# Patient Record
Sex: FEMALE | Race: WHITE | NOT HISPANIC OR LATINO | Employment: STUDENT | URBAN - METROPOLITAN AREA
[De-identification: names, ages, dates, MRNs, and addresses within clinical notes are randomized per-mention and may not be internally consistent; named-entity substitution may affect disease eponyms.]

---
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
## 2018-07-10 ENCOUNTER — HOSPITAL ENCOUNTER (OUTPATIENT)
Dept: FAMILY MEDICINE CLINIC | Facility: CLINIC | Age: 18
Setting detail: SPECIMEN
Discharge: HOME | End: 2018-07-10
Attending: FAMILY MEDICINE | Admitting: FAMILY MEDICINE

## 2018-07-10 LAB
ALBUMIN SERPL-MCNC: 4.2 G/DL (ref 3.5–4.8)
ALBUMIN/GLOB SERPL: 1.2 {RATIO} (ref 1–1.7)
ALP SERPL-CCNC: 56 IU/L (ref 32–91)
ALT SERPL-CCNC: 20 IU/L (ref 14–54)
ANION GAP SERPL CALC-SCNC: 10.9 MMOL/L (ref 10–20)
AST SERPL-CCNC: 20 IU/L (ref 15–41)
BASOPHILS # BLD AUTO: 0.1 10*3/UL (ref 0–0.2)
BASOPHILS NFR BLD AUTO: 1 % (ref 0–2)
BILIRUB SERPL-MCNC: 0.6 MG/DL (ref 0.3–1.2)
BUN SERPL-MCNC: 5 MG/DL (ref 8–20)
BUN/CREAT SERPL: 6.3 (ref 5.4–26.2)
CALCIUM SERPL-MCNC: 9.6 MG/DL (ref 8.9–10.3)
CHLORIDE SERPL-SCNC: 106 MMOL/L (ref 101–111)
CONV CO2: 24 MMOL/L (ref 22–32)
CONV TOTAL PROTEIN: 7.6 G/DL (ref 6.1–7.9)
CREAT UR-MCNC: 0.8 MG/DL (ref 0.4–1)
D DIMER PPP FEU-MCNC: 0.18 MG/L FEU (ref 0.17–0.59)
DIFFERENTIAL METHOD BLD: (no result)
EOSINOPHIL # BLD AUTO: 0.2 10*3/UL (ref 0–0.3)
EOSINOPHIL # BLD AUTO: 2 % (ref 0–3)
ERYTHROCYTE [DISTWIDTH] IN BLOOD BY AUTOMATED COUNT: 12.6 % (ref 11.5–14.5)
GLOBULIN UR ELPH-MCNC: 3.4 G/DL (ref 2.5–3.8)
GLUCOSE SERPL-MCNC: 93 MG/DL (ref 65–99)
HCT VFR BLD AUTO: 40.2 % (ref 35–49)
HGB BLD-MCNC: 13.7 G/DL (ref 12–15)
LYMPHOCYTES # BLD AUTO: 1.1 10*3/UL (ref 0.8–4.8)
LYMPHOCYTES NFR BLD AUTO: 13 % (ref 18–42)
MAGNESIUM SERPL-MCNC: 1.8 MG/DL (ref 1.8–2.5)
MCH RBC QN AUTO: 30.2 PG (ref 26–32)
MCHC RBC AUTO-ENTMCNC: 34 G/DL (ref 32–36)
MCV RBC AUTO: 88.8 FL (ref 80–94)
MONOCYTES # BLD AUTO: 0.6 10*3/UL (ref 0.1–1.3)
MONOCYTES NFR BLD AUTO: 7 % (ref 2–11)
NEUTROPHILS # BLD AUTO: 7.1 10*3/UL (ref 2.3–8.6)
NEUTROPHILS NFR BLD AUTO: 77 % (ref 50–75)
NRBC BLD AUTO-RTO: 0 /100{WBCS}
NRBC/RBC NFR BLD MANUAL: 0 10*3/UL
PLATELET # BLD AUTO: 288 10*3/UL (ref 150–450)
PMV BLD AUTO: 7.3 FL (ref 7.4–10.4)
POTASSIUM SERPL-SCNC: 3.9 MMOL/L (ref 3.6–5.1)
RBC # BLD AUTO: 4.52 10*6/UL (ref 4–5.4)
SODIUM SERPL-SCNC: 137 MMOL/L (ref 136–144)
WBC # BLD AUTO: 9.1 10*3/UL (ref 4.5–11.5)

## 2018-12-12 ENCOUNTER — HOSPITAL ENCOUNTER (OUTPATIENT)
Dept: FAMILY MEDICINE CLINIC | Facility: CLINIC | Age: 18
Setting detail: SPECIMEN
Discharge: HOME | End: 2018-12-12
Attending: FAMILY MEDICINE | Admitting: FAMILY MEDICINE

## 2018-12-12 LAB
25(OH)D3 SERPL-MCNC: 23 NG/ML (ref 30–100)
ABS VARIANT LYMPHS: 0.05 10*3/UL
ALBUMIN SERPL-MCNC: 3.9 G/DL (ref 3.5–4.8)
ALBUMIN/GLOB SERPL: 1.3 {RATIO} (ref 1–1.7)
ALP SERPL-CCNC: 44 IU/L (ref 32–91)
ALT SERPL-CCNC: 36 IU/L (ref 14–54)
ANION GAP SERPL CALC-SCNC: 10.9 MMOL/L (ref 10–20)
AST SERPL-CCNC: 49 IU/L (ref 15–41)
BASOPHILS # BLD AUTO: 0.1 10*3/UL (ref 0–0.2)
BASOPHILS NFR BLD AUTO: 2 % (ref 0–2)
BILIRUB SERPL-MCNC: 0.4 MG/DL (ref 0.3–1.2)
BUN SERPL-MCNC: 9 MG/DL (ref 8–20)
BUN/CREAT SERPL: 11.3 (ref 5.4–26.2)
CALCIUM SERPL-MCNC: 9.1 MG/DL (ref 8.9–10.3)
CHLORIDE SERPL-SCNC: 102 MMOL/L (ref 101–111)
CONV ABS BANDS: 0.1 10*3/UL
CONV CO2: 26 MMOL/L (ref 22–32)
CONV TOTAL PROTEIN: 6.8 G/DL (ref 6.1–7.9)
CONV VARIANT LYMPHOCYTES RELATIVE PERCENT BY MANUAL COUNT: 1 % (ref 0–1)
CREAT UR-MCNC: 0.8 MG/DL (ref 0.4–1)
DIFFERENTIAL METHOD BLD: (no result)
EOSINOPHIL # BLD AUTO: 1.1 10*3/UL (ref 0–0.3)
EOSINOPHIL # BLD AUTO: 21 % (ref 0–3)
ERYTHROCYTE [DISTWIDTH] IN BLOOD BY AUTOMATED COUNT: 13.4 % (ref 11.5–14.5)
FOLATE SERPL-MCNC: >24.8 NG/ML (ref 5.9–24.8)
GLOBULIN UR ELPH-MCNC: 2.9 G/DL (ref 2.5–3.8)
GLUCOSE SERPL-MCNC: 89 MG/DL (ref 65–99)
HCT VFR BLD AUTO: 37.5 % (ref 35–49)
HGB BLD-MCNC: 12.8 G/DL (ref 12–15)
LYMPHOCYTES # BLD AUTO: 1.1 10*3/UL (ref 0.8–4.8)
LYMPHOCYTES NFR BLD AUTO: 20 % (ref 18–42)
MAGNESIUM SERPL-MCNC: 1.8 MG/DL (ref 1.8–2.5)
MCH RBC QN AUTO: 31.1 PG (ref 26–32)
MCHC RBC AUTO-ENTMCNC: 34.2 G/DL (ref 32–36)
MCV RBC AUTO: 91.1 FL (ref 80–94)
MONOCYTES # BLD AUTO: 0.3 10*3/UL (ref 0.1–1.3)
MONOCYTES NFR BLD AUTO: 6 % (ref 2–11)
NEUTROPHILS # BLD AUTO: 2.7 10*3/UL (ref 2.3–8.6)
NEUTROPHILS NFR BLD AUTO: 49 % (ref 50–75)
NEUTS BAND NFR BLD MANUAL: 1 % (ref 0–5)
PATHOLOGIST REVIEW: (no result)
PLATELET # BLD AUTO: 270 10*3/UL (ref 150–450)
PMV BLD AUTO: 7.6 FL (ref 7.4–10.4)
POTASSIUM SERPL-SCNC: 3.9 MMOL/L (ref 3.6–5.1)
RBC # BLD AUTO: 4.12 10*6/UL (ref 4–5.4)
SODIUM SERPL-SCNC: 135 MMOL/L (ref 136–144)
VIT B12 SERPL-MCNC: 1211 PG/ML (ref 180–914)
WBC # BLD AUTO: 5.4 10*3/UL (ref 4.5–11.5)
WBC COMMENT: (no result)

## 2018-12-14 LAB — ZINC SERPL-MCNC: 55 MCG/DL (ref 60–130)

## 2018-12-15 LAB — METHYLMALONATE SERPL-SCNC: 74 NMOL/L (ref 87–318)

## 2018-12-16 LAB — VIT B6 SERPL-MCNC: 48.9 NG/ML (ref 2.1–21.7)

## 2018-12-17 LAB
A-TOCOPHEROL VIT E SERPL-MCNC: 9 MG/L (ref 5.7–19.9)
BETA+GAMMA TOCOPHEROL SERPL-MCNC: 1.5 MG/L
PHYTONADIONE SERPL-MCNC: 1212 PG/ML (ref 80–1160)
VIT A SERPL-MCNC: 30 MCG/DL (ref 38–98)
VIT B1 BLD-MCNC: 17 NMOL/L (ref 8–30)

## 2019-06-26 ENCOUNTER — LAB (OUTPATIENT)
Dept: FAMILY MEDICINE CLINIC | Facility: CLINIC | Age: 19
End: 2019-06-26

## 2019-06-26 DIAGNOSIS — Z00.00 ROUTINE MEDICAL EXAM: ICD-10-CM

## 2019-06-26 DIAGNOSIS — Z00.00 ROUTINE MEDICAL EXAM: Primary | ICD-10-CM

## 2019-06-26 DIAGNOSIS — Z78.9 CONSUMES A VEGAN DIET: ICD-10-CM

## 2019-06-26 LAB
25(OH)D3 SERPL-MCNC: 31.6 NG/ML (ref 30–100)
ALBUMIN SERPL-MCNC: 3.6 G/DL (ref 3.5–4.8)
ALBUMIN/GLOB SERPL: 1.4 G/DL (ref 1–1.7)
ALP SERPL-CCNC: 52 U/L (ref 32–91)
ALT SERPL W P-5'-P-CCNC: 17 U/L (ref 14–54)
ANION GAP SERPL CALCULATED.3IONS-SCNC: 8 MMOL/L (ref 10–20)
ARTICHOKE IGE QN: 72 MG/DL (ref 0–100)
AST SERPL-CCNC: 15 U/L (ref 15–41)
BASOPHILS # BLD AUTO: 0 10*3/MM3 (ref 0–0.2)
BASOPHILS NFR BLD AUTO: 0.7 % (ref 0–1.5)
BILIRUB SERPL-MCNC: 0.8 MG/DL (ref 0.3–1.2)
BUN BLD-MCNC: 14 MG/DL (ref 8–20)
BUN/CREAT SERPL: 15.6 (ref 5.4–26.2)
CALCIUM SPEC-SCNC: 9 MG/DL (ref 8.9–10.3)
CHLORIDE SERPL-SCNC: 106 MMOL/L (ref 101–111)
CHOLEST SERPL-MCNC: 130 MG/DL
CO2 SERPL-SCNC: 24 MMOL/L (ref 22–32)
CREAT BLD-MCNC: 0.9 MG/DL (ref 0.4–1)
DEPRECATED RDW RBC AUTO: 40.3 FL (ref 37–54)
EOSINOPHIL # BLD AUTO: 0.2 10*3/MM3 (ref 0–0.4)
EOSINOPHIL NFR BLD AUTO: 3.3 % (ref 0.3–6.2)
ERYTHROCYTE [DISTWIDTH] IN BLOOD BY AUTOMATED COUNT: 12.4 % (ref 12.3–15.4)
GFR SERPL CREATININE-BSD FRML MDRD: 79 ML/MIN/1.73
GLOBULIN UR ELPH-MCNC: 2.5 GM/DL (ref 2.5–3.8)
GLUCOSE BLD-MCNC: 94 MG/DL (ref 65–99)
HCT VFR BLD AUTO: 39.1 % (ref 34–46.6)
HDLC SERPL QL: 2.5
HDLC SERPL-MCNC: 52 MG/DL
HGB BLD-MCNC: 13.1 G/DL (ref 12–15.9)
LDLC/HDLC SERPL: 1.3 {RATIO}
LYMPHOCYTES # BLD AUTO: 2.1 10*3/MM3 (ref 0.7–3.1)
LYMPHOCYTES NFR BLD AUTO: 37.3 % (ref 19.6–45.3)
MCH RBC QN AUTO: 31.1 PG (ref 26.6–33)
MCHC RBC AUTO-ENTMCNC: 33.4 G/DL (ref 31.5–35.7)
MCV RBC AUTO: 93.1 FL (ref 79–97)
MONOCYTES # BLD AUTO: 0.6 10*3/MM3 (ref 0.1–0.9)
MONOCYTES NFR BLD AUTO: 10.3 % (ref 5–12)
NEUTROPHILS # BLD AUTO: 2.7 10*3/MM3 (ref 1.7–7)
NEUTROPHILS NFR BLD AUTO: 48.4 % (ref 42.7–76)
NRBC BLD AUTO-RTO: 0.2 /100 WBC (ref 0–0.2)
PLATELET # BLD AUTO: 230 10*3/MM3 (ref 140–450)
PMV BLD AUTO: 7.1 FL (ref 6–12)
POTASSIUM BLD-SCNC: 3.7 MMOL/L (ref 3.6–5.1)
PROT SERPL-MCNC: 6.1 G/DL (ref 6.1–7.9)
RBC # BLD AUTO: 4.2 10*6/MM3 (ref 3.77–5.28)
SODIUM BLD-SCNC: 138 MMOL/L (ref 136–144)
TRIGL SERPL-MCNC: 51 MG/DL
VIT B12 BLD-MCNC: 926 PG/ML (ref 180–914)
VLDLC SERPL-MCNC: 10.2 MG/DL
WBC NRBC COR # BLD: 5.5 10*3/MM3 (ref 3.4–10.8)

## 2019-06-26 PROCEDURE — 84630 ASSAY OF ZINC: CPT | Performed by: FAMILY MEDICINE

## 2019-06-26 PROCEDURE — 80061 LIPID PANEL: CPT | Performed by: FAMILY MEDICINE

## 2019-06-26 PROCEDURE — 36415 COLL VENOUS BLD VENIPUNCTURE: CPT | Performed by: FAMILY MEDICINE

## 2019-06-26 PROCEDURE — 85025 COMPLETE CBC W/AUTO DIFF WBC: CPT | Performed by: FAMILY MEDICINE

## 2019-06-26 PROCEDURE — 82607 VITAMIN B-12: CPT | Performed by: FAMILY MEDICINE

## 2019-06-26 PROCEDURE — 80053 COMPREHEN METABOLIC PANEL: CPT | Performed by: FAMILY MEDICINE

## 2019-06-26 PROCEDURE — 82306 VITAMIN D 25 HYDROXY: CPT | Performed by: FAMILY MEDICINE

## 2019-06-26 PROCEDURE — 84590 ASSAY OF VITAMIN A: CPT | Performed by: FAMILY MEDICINE

## 2019-06-27 RX ORDER — ERGOCALCIFEROL 1.25 MG/1
1 CAPSULE ORAL DAILY
COMMUNITY
Start: 2018-12-17 | End: 2019-06-27 | Stop reason: SDUPTHER

## 2019-06-27 RX ORDER — ERGOCALCIFEROL 1.25 MG/1
1 CAPSULE ORAL DAILY
Qty: 12 CAPSULE | Refills: 5 | Status: SHIPPED | OUTPATIENT
Start: 2019-06-27 | End: 2020-02-03 | Stop reason: SDUPTHER

## 2019-06-28 LAB — ZINC SERPL-MCNC: 67 UG/DL (ref 56–134)

## 2019-06-30 LAB — VIT A SERPL-MCNC: 36.1 UG/DL (ref 18.9–57.3)

## 2019-11-04 ENCOUNTER — FLU SHOT (OUTPATIENT)
Dept: FAMILY MEDICINE CLINIC | Facility: CLINIC | Age: 19
End: 2019-11-04

## 2019-11-04 DIAGNOSIS — Z23 NEED FOR VACCINATION: Primary | ICD-10-CM

## 2019-11-04 PROCEDURE — 90674 CCIIV4 VAC NO PRSV 0.5 ML IM: CPT | Performed by: FAMILY MEDICINE

## 2019-11-04 PROCEDURE — 90471 IMMUNIZATION ADMIN: CPT | Performed by: FAMILY MEDICINE

## 2020-02-03 RX ORDER — ERGOCALCIFEROL 1.25 MG/1
1 CAPSULE ORAL WEEKLY
Qty: 12 CAPSULE | Refills: 5 | Status: SHIPPED | OUTPATIENT
Start: 2020-02-03 | End: 2021-04-05

## 2020-05-26 ENCOUNTER — CLINICAL SUPPORT (OUTPATIENT)
Dept: FAMILY MEDICINE CLINIC | Facility: CLINIC | Age: 20
End: 2020-05-26

## 2020-05-26 DIAGNOSIS — Z23 NEED FOR VACCINATION TO PREVENT TUBERCULOSIS: Primary | ICD-10-CM

## 2020-05-26 PROCEDURE — 86580 TB INTRADERMAL TEST: CPT | Performed by: FAMILY MEDICINE

## 2020-05-28 LAB
INDURATION: 0 MM (ref 0–10)
TB SKIN TEST: NEGATIVE

## 2020-06-09 ENCOUNTER — CLINICAL SUPPORT (OUTPATIENT)
Dept: FAMILY MEDICINE CLINIC | Facility: CLINIC | Age: 20
End: 2020-06-09

## 2020-06-09 DIAGNOSIS — Z23 NEED FOR VACCINATION TO PREVENT TUBERCULOSIS: Primary | ICD-10-CM

## 2020-06-09 PROCEDURE — 86580 TB INTRADERMAL TEST: CPT | Performed by: FAMILY MEDICINE

## 2020-06-11 ENCOUNTER — LAB (OUTPATIENT)
Dept: FAMILY MEDICINE CLINIC | Facility: CLINIC | Age: 20
End: 2020-06-11

## 2020-06-11 DIAGNOSIS — Z92.29 IMMUNIZATIONS UP TO DATE: Primary | ICD-10-CM

## 2020-06-11 DIAGNOSIS — Z92.29 IMMUNIZATIONS UP TO DATE: ICD-10-CM

## 2020-06-11 LAB
HBV SURFACE AB SER RIA-ACNC: REACTIVE
INDURATION: 0 MM (ref 0–10)
TB SKIN TEST: NEGATIVE

## 2020-06-11 PROCEDURE — 36415 COLL VENOUS BLD VENIPUNCTURE: CPT

## 2020-06-11 PROCEDURE — 86787 VARICELLA-ZOSTER ANTIBODY: CPT | Performed by: FAMILY MEDICINE

## 2020-06-11 PROCEDURE — 86706 HEP B SURFACE ANTIBODY: CPT | Performed by: FAMILY MEDICINE

## 2020-06-12 LAB — VZV IGG SER IA-ACNC: NORMAL

## 2020-06-24 ENCOUNTER — LAB (OUTPATIENT)
Dept: FAMILY MEDICINE CLINIC | Facility: CLINIC | Age: 20
End: 2020-06-24

## 2020-06-24 DIAGNOSIS — Z00.00 ENCOUNTER FOR PREVENTIVE CARE: Primary | ICD-10-CM

## 2020-06-24 DIAGNOSIS — Z20.822 EXPOSURE TO COVID-19 VIRUS: ICD-10-CM

## 2020-06-24 DIAGNOSIS — Z20.822 EXPOSURE TO COVID-19 VIRUS: Primary | ICD-10-CM

## 2020-06-24 DIAGNOSIS — Z00.00 ENCOUNTER FOR PREVENTIVE CARE: ICD-10-CM

## 2020-06-24 LAB
25(OH)D3 SERPL-MCNC: 40.9 NG/ML (ref 30–100)
ANION GAP SERPL CALCULATED.3IONS-SCNC: 11.9 MMOL/L (ref 5–15)
BUN BLD-MCNC: 14 MG/DL (ref 6–20)
BUN/CREAT SERPL: 15.6 (ref 7–25)
CALCIUM SPEC-SCNC: 9.9 MG/DL (ref 8.6–10.5)
CHLORIDE SERPL-SCNC: 100 MMOL/L (ref 98–107)
CO2 SERPL-SCNC: 25.1 MMOL/L (ref 22–29)
CREAT BLD-MCNC: 0.9 MG/DL (ref 0.57–1)
GFR SERPL CREATININE-BSD FRML MDRD: 78 ML/MIN/1.73
GLUCOSE BLD-MCNC: 88 MG/DL (ref 65–99)
POTASSIUM BLD-SCNC: 4.3 MMOL/L (ref 3.5–5.2)
SODIUM BLD-SCNC: 137 MMOL/L (ref 136–145)
VIT B12 BLD-MCNC: 674 PG/ML (ref 211–946)

## 2020-06-24 PROCEDURE — 84630 ASSAY OF ZINC: CPT | Performed by: FAMILY MEDICINE

## 2020-06-24 PROCEDURE — 82607 VITAMIN B-12: CPT | Performed by: FAMILY MEDICINE

## 2020-06-24 PROCEDURE — 84590 ASSAY OF VITAMIN A: CPT | Performed by: FAMILY MEDICINE

## 2020-06-24 PROCEDURE — 80048 BASIC METABOLIC PNL TOTAL CA: CPT | Performed by: FAMILY MEDICINE

## 2020-06-24 PROCEDURE — 36415 COLL VENOUS BLD VENIPUNCTURE: CPT | Performed by: FAMILY MEDICINE

## 2020-06-24 PROCEDURE — 82306 VITAMIN D 25 HYDROXY: CPT | Performed by: FAMILY MEDICINE

## 2020-06-24 PROCEDURE — 86769 SARS-COV-2 COVID-19 ANTIBODY: CPT | Performed by: FAMILY MEDICINE

## 2020-06-26 LAB — SARS-COV-2 AB SERPL QL IA: NEGATIVE

## 2020-06-27 LAB — ZINC SERPL-MCNC: 77 UG/DL (ref 56–134)

## 2020-06-29 LAB — VIT A SERPL-MCNC: 23.6 UG/DL (ref 18.9–57.3)

## 2020-07-29 ENCOUNTER — TELEPHONE (OUTPATIENT)
Dept: FAMILY MEDICINE CLINIC | Facility: CLINIC | Age: 20
End: 2020-07-29

## 2020-07-29 NOTE — TELEPHONE ENCOUNTER
Pts 11/4/19 Flu Shot was billed put under Dr. Timmons but it should have been under you.  Billing asked me to have you reenter it under DOS 11/4/19 and then they can correct.

## 2020-11-03 ENCOUNTER — CLINICAL SUPPORT (OUTPATIENT)
Dept: FAMILY MEDICINE CLINIC | Facility: CLINIC | Age: 20
End: 2020-11-03

## 2020-11-03 DIAGNOSIS — Z23 NEED FOR VACCINATION: Primary | ICD-10-CM

## 2020-11-03 PROCEDURE — 90686 IIV4 VACC NO PRSV 0.5 ML IM: CPT | Performed by: FAMILY MEDICINE

## 2020-11-03 PROCEDURE — 90471 IMMUNIZATION ADMIN: CPT | Performed by: FAMILY MEDICINE

## 2020-11-23 RX ORDER — ADAPALENE 45 G/G
GEL TOPICAL NIGHTLY
Qty: 45 G | Refills: 3 | Status: SHIPPED | OUTPATIENT
Start: 2020-11-23 | End: 2022-07-08 | Stop reason: SDUPTHER

## 2021-04-05 RX ORDER — ERGOCALCIFEROL 1.25 MG/1
CAPSULE ORAL
Qty: 12 CAPSULE | Refills: 4 | Status: SHIPPED | OUTPATIENT
Start: 2021-04-05 | End: 2021-07-13 | Stop reason: SDUPTHER

## 2021-05-28 ENCOUNTER — LAB (OUTPATIENT)
Dept: FAMILY MEDICINE CLINIC | Facility: CLINIC | Age: 21
End: 2021-05-28

## 2021-05-28 DIAGNOSIS — Z00.00 ENCOUNTER FOR PREVENTIVE CARE: ICD-10-CM

## 2021-05-28 DIAGNOSIS — Z00.00 ENCOUNTER FOR PREVENTIVE CARE: Primary | ICD-10-CM

## 2021-05-28 PROCEDURE — 86317 IMMUNOASSAY INFECTIOUS AGENT: CPT | Performed by: FAMILY MEDICINE

## 2021-05-28 PROCEDURE — 36415 COLL VENOUS BLD VENIPUNCTURE: CPT

## 2021-05-29 LAB — HBV SURFACE AB SER-ACNC: 49.2 MIU/ML

## 2021-06-08 ENCOUNTER — CLINICAL SUPPORT (OUTPATIENT)
Dept: FAMILY MEDICINE CLINIC | Facility: CLINIC | Age: 21
End: 2021-06-08

## 2021-06-08 DIAGNOSIS — Z11.1 SCREENING FOR TUBERCULOSIS: Primary | ICD-10-CM

## 2021-06-08 PROCEDURE — 86580 TB INTRADERMAL TEST: CPT | Performed by: FAMILY MEDICINE

## 2021-06-10 LAB
INDURATION: 0 MM (ref 0–10)
Lab: NORMAL
Lab: NORMAL
TB SKIN TEST: NEGATIVE

## 2021-07-13 RX ORDER — ERGOCALCIFEROL 1.25 MG/1
50000 CAPSULE ORAL WEEKLY
Qty: 12 CAPSULE | Refills: 4 | Status: SHIPPED | OUTPATIENT
Start: 2021-07-13 | End: 2022-08-29

## 2021-10-13 ENCOUNTER — FLU SHOT (OUTPATIENT)
Dept: FAMILY MEDICINE CLINIC | Facility: CLINIC | Age: 21
End: 2021-10-13

## 2021-10-13 DIAGNOSIS — Z23 NEED FOR VACCINATION: Primary | ICD-10-CM

## 2021-10-13 PROCEDURE — 90471 IMMUNIZATION ADMIN: CPT | Performed by: FAMILY MEDICINE

## 2021-10-13 PROCEDURE — 90686 IIV4 VACC NO PRSV 0.5 ML IM: CPT | Performed by: FAMILY MEDICINE

## 2021-12-27 ENCOUNTER — LAB (OUTPATIENT)
Dept: FAMILY MEDICINE CLINIC | Facility: CLINIC | Age: 21
End: 2021-12-27

## 2021-12-27 DIAGNOSIS — Z20.822 EXPOSURE TO COVID-19 VIRUS: ICD-10-CM

## 2021-12-27 DIAGNOSIS — Z20.822 EXPOSURE TO COVID-19 VIRUS: Primary | ICD-10-CM

## 2021-12-27 LAB — SARS-COV-2 ORF1AB RESP QL NAA+PROBE: NOT DETECTED

## 2021-12-27 PROCEDURE — U0004 COV-19 TEST NON-CDC HGH THRU: HCPCS | Performed by: FAMILY MEDICINE

## 2022-02-08 RX ORDER — SPIRONOLACTONE 100 MG/1
100 TABLET, FILM COATED ORAL DAILY
Qty: 90 TABLET | Refills: 1 | Status: SHIPPED | OUTPATIENT
Start: 2022-02-08 | End: 2022-04-29 | Stop reason: SDUPTHER

## 2022-03-16 ENCOUNTER — OFFICE VISIT (OUTPATIENT)
Dept: FAMILY MEDICINE CLINIC | Facility: CLINIC | Age: 22
End: 2022-03-16

## 2022-03-16 ENCOUNTER — LAB (OUTPATIENT)
Dept: FAMILY MEDICINE CLINIC | Facility: CLINIC | Age: 22
End: 2022-03-16

## 2022-03-16 VITALS
HEIGHT: 66 IN | HEART RATE: 106 BPM | TEMPERATURE: 98 F | OXYGEN SATURATION: 98 % | DIASTOLIC BLOOD PRESSURE: 84 MMHG | SYSTOLIC BLOOD PRESSURE: 134 MMHG | WEIGHT: 160 LBS | BODY MASS INDEX: 25.71 KG/M2

## 2022-03-16 DIAGNOSIS — Z00.00 ENCOUNTER FOR PREVENTIVE CARE: Primary | ICD-10-CM

## 2022-03-16 DIAGNOSIS — Z00.00 ENCOUNTER FOR PREVENTIVE CARE: ICD-10-CM

## 2022-03-16 LAB
25(OH)D3 SERPL-MCNC: 42.4 NG/ML (ref 30–100)
ANION GAP SERPL CALCULATED.3IONS-SCNC: 9.3 MMOL/L (ref 5–15)
BASOPHILS # BLD AUTO: 0.06 10*3/MM3 (ref 0–0.2)
BASOPHILS NFR BLD AUTO: 0.7 % (ref 0–1.5)
BUN SERPL-MCNC: 21 MG/DL (ref 6–20)
BUN/CREAT SERPL: 22.8 (ref 7–25)
CALCIUM SPEC-SCNC: 10.1 MG/DL (ref 8.6–10.5)
CHLORIDE SERPL-SCNC: 102 MMOL/L (ref 98–107)
CO2 SERPL-SCNC: 26.7 MMOL/L (ref 22–29)
CREAT SERPL-MCNC: 0.92 MG/DL (ref 0.57–1)
DEPRECATED RDW RBC AUTO: 42.2 FL (ref 37–54)
EGFRCR SERPLBLD CKD-EPI 2021: 89.4 ML/MIN/1.73
EOSINOPHIL # BLD AUTO: 0.09 10*3/MM3 (ref 0–0.4)
EOSINOPHIL NFR BLD AUTO: 1.1 % (ref 0.3–6.2)
ERYTHROCYTE [DISTWIDTH] IN BLOOD BY AUTOMATED COUNT: 12.3 % (ref 12.3–15.4)
GLUCOSE SERPL-MCNC: 84 MG/DL (ref 65–99)
HCT VFR BLD AUTO: 42 % (ref 34–46.6)
HGB BLD-MCNC: 13.8 G/DL (ref 12–15.9)
IMM GRANULOCYTES # BLD AUTO: 0.03 10*3/MM3 (ref 0–0.05)
IMM GRANULOCYTES NFR BLD AUTO: 0.4 % (ref 0–0.5)
LYMPHOCYTES # BLD AUTO: 1.7 10*3/MM3 (ref 0.7–3.1)
LYMPHOCYTES NFR BLD AUTO: 20.1 % (ref 19.6–45.3)
MCH RBC QN AUTO: 30.9 PG (ref 26.6–33)
MCHC RBC AUTO-ENTMCNC: 32.9 G/DL (ref 31.5–35.7)
MCV RBC AUTO: 94.2 FL (ref 79–97)
MONOCYTES # BLD AUTO: 0.56 10*3/MM3 (ref 0.1–0.9)
MONOCYTES NFR BLD AUTO: 6.6 % (ref 5–12)
NEUTROPHILS NFR BLD AUTO: 6.03 10*3/MM3 (ref 1.7–7)
NEUTROPHILS NFR BLD AUTO: 71.1 % (ref 42.7–76)
NRBC BLD AUTO-RTO: 0 /100 WBC (ref 0–0.2)
PLATELET # BLD AUTO: 224 10*3/MM3 (ref 140–450)
PMV BLD AUTO: 9.4 FL (ref 6–12)
POTASSIUM SERPL-SCNC: 4.1 MMOL/L (ref 3.5–5.2)
RBC # BLD AUTO: 4.46 10*6/MM3 (ref 3.77–5.28)
SODIUM SERPL-SCNC: 138 MMOL/L (ref 136–145)
VIT B12 BLD-MCNC: >2000 PG/ML (ref 211–946)
WBC NRBC COR # BLD: 8.47 10*3/MM3 (ref 3.4–10.8)

## 2022-03-16 PROCEDURE — 99395 PREV VISIT EST AGE 18-39: CPT | Performed by: FAMILY MEDICINE

## 2022-03-16 PROCEDURE — 82607 VITAMIN B-12: CPT | Performed by: FAMILY MEDICINE

## 2022-03-16 PROCEDURE — 86769 SARS-COV-2 COVID-19 ANTIBODY: CPT | Performed by: FAMILY MEDICINE

## 2022-03-16 PROCEDURE — 82306 VITAMIN D 25 HYDROXY: CPT | Performed by: FAMILY MEDICINE

## 2022-03-16 PROCEDURE — 36415 COLL VENOUS BLD VENIPUNCTURE: CPT | Performed by: FAMILY MEDICINE

## 2022-03-16 PROCEDURE — 80048 BASIC METABOLIC PNL TOTAL CA: CPT | Performed by: FAMILY MEDICINE

## 2022-03-16 PROCEDURE — 85025 COMPLETE CBC W/AUTO DIFF WBC: CPT | Performed by: FAMILY MEDICINE

## 2022-03-16 NOTE — PROGRESS NOTES
"Subjective   Brenda Timmons is a 22 y.o. female and is here for a comprehensive physical exam. The patient reports no problems.    Do you take any herbs or supplements that were not prescribed by a doctor? no  Are you taking calcium supplements? no  Are you taking aspirin daily? no    The following portions of the patient's history were reviewed and updated as appropriate: allergies, current medications, past family history, past medical history, past social history, past surgical history and problem list.    Review of Systems  Do you have pain that bothers you in your daily life? no  A comprehensive review of systems was negative.    Objective   /84 (BP Location: Left arm, Patient Position: Sitting, Cuff Size: Adult)   Pulse 106   Temp 98 °F (36.7 °C) (Infrared)   Ht 167.6 cm (66\")   Wt 72.6 kg (160 lb)   SpO2 98%   BMI 25.82 kg/m²   General appearance: alert, appears stated age and cooperative  Head: Normocephalic, without obvious abnormality, atraumatic  Eyes: conjunctivae/corneas clear. PERRL, EOM's intact. Fundi benign.  Ears: normal TM's and external ear canals both ears  Throat: lips, mucosa, and tongue normal; teeth and gums normal  Neck: no adenopathy, no JVD, supple, symmetrical, trachea midline and thyroid not enlarged, symmetric, no tenderness/mass/nodules  Lungs: clear to auscultation bilaterally  Heart: regular rate and rhythm, S1, S2 normal, no murmur, click, rub or gallop  Abdomen: soft, non-tender; bowel sounds normal; no masses,  no organomegaly  Extremities: extremities normal, atraumatic, no cyanosis or edema  Pulses: 2+ and symmetric  Skin: moderate acne  Lymph nodes: Cervical, supraclavicular, and axillary nodes normal.  Neurologic: Grossly normal     No visits with results within 1 Week(s) from this visit.   Latest known visit with results is:   Lab on 12/27/2021   Component Date Value Ref Range Status   • COVID19 12/27/2021 Not Detected  Not Detected - Ref. Range Final "       Assessment/Plan   Healthy female exam.  Diagnoses and all orders for this visit:    1. Encounter for preventive care (Primary)  -     Basic Metabolic Panel  -     Vitamin B12  -     Vitamin D 25 Hydroxy  -     CBC & Differential  -     SARS-CoV-2 Antibodies ([facility]); Future      1. Well exam.  2. Patient Counseling:  --Nutrition: Stressed importance of moderation in sodium/caffeine intake, saturated fat and cholesterol, caloric balance, sufficient intake of fresh fruits, vegetables, fiber, calcium, iron, and 1 mg of folate supplement per day (for females capable of pregnancy).  --Discussed the issue of estrogen replacement, calcium supplement, and the daily use of baby aspirin.  --Exercise: Stressed the importance of regular exercise.    --Injury prevention: Discussed safety belts, safety helmets, smoke detector, smoking near bedding or upholstery.   --Dental health: Discussed importance of regular tooth brushing, flossing, and dental visits.  --Immunizations reviewed.    3. Discussed the patient's BMI with her.  The BMI is above average; no BMI management plan is appropriate  4. Follow up in one year

## 2022-03-17 LAB — SARS-COV-2 AB SERPL QL IA: NEGATIVE

## 2022-05-02 RX ORDER — SPIRONOLACTONE 100 MG/1
100 TABLET, FILM COATED ORAL DAILY
Qty: 90 TABLET | Refills: 3 | Status: SHIPPED | OUTPATIENT
Start: 2022-05-02 | End: 2023-03-20

## 2022-07-05 RX ORDER — VALACYCLOVIR HYDROCHLORIDE 1 G/1
1000 TABLET, FILM COATED ORAL 2 TIMES DAILY
Qty: 30 TABLET | Refills: 1 | Status: SHIPPED | OUTPATIENT
Start: 2022-07-05

## 2022-07-08 RX ORDER — ADAPALENE 45 G/G
GEL TOPICAL NIGHTLY
Qty: 45 G | Refills: 3 | Status: SHIPPED | OUTPATIENT
Start: 2022-07-08

## 2022-08-29 RX ORDER — ERGOCALCIFEROL 1.25 MG/1
CAPSULE ORAL
Qty: 12 CAPSULE | Refills: 4 | Status: SHIPPED | OUTPATIENT
Start: 2022-08-29

## 2022-12-20 ENCOUNTER — CLINICAL SUPPORT (OUTPATIENT)
Dept: FAMILY MEDICINE CLINIC | Facility: CLINIC | Age: 22
End: 2022-12-20

## 2022-12-20 DIAGNOSIS — Z23 NEED FOR VACCINATION: Primary | ICD-10-CM

## 2022-12-20 PROCEDURE — 86580 TB INTRADERMAL TEST: CPT | Performed by: FAMILY MEDICINE

## 2023-03-20 RX ORDER — SPIRONOLACTONE 100 MG/1
TABLET, FILM COATED ORAL
Qty: 90 TABLET | Refills: 3 | Status: SHIPPED | OUTPATIENT
Start: 2023-03-20

## 2023-06-09 RX ORDER — SPIRONOLACTONE 100 MG/1
100 TABLET, FILM COATED ORAL DAILY
Qty: 90 TABLET | Refills: 3 | Status: SHIPPED | OUTPATIENT
Start: 2023-06-09

## 2023-10-27 ENCOUNTER — LAB (OUTPATIENT)
Dept: FAMILY MEDICINE CLINIC | Facility: CLINIC | Age: 23
End: 2023-10-27
Payer: COMMERCIAL

## 2023-10-27 ENCOUNTER — OFFICE VISIT (OUTPATIENT)
Dept: FAMILY MEDICINE CLINIC | Facility: CLINIC | Age: 23
End: 2023-10-27
Payer: COMMERCIAL

## 2023-10-27 VITALS
HEIGHT: 66 IN | WEIGHT: 165.6 LBS | TEMPERATURE: 97.3 F | DIASTOLIC BLOOD PRESSURE: 71 MMHG | HEART RATE: 84 BPM | BODY MASS INDEX: 26.61 KG/M2 | OXYGEN SATURATION: 98 % | SYSTOLIC BLOOD PRESSURE: 115 MMHG

## 2023-10-27 DIAGNOSIS — Z00.00 ENCOUNTER FOR PREVENTIVE CARE: Primary | ICD-10-CM

## 2023-10-27 PROCEDURE — 80061 LIPID PANEL: CPT | Performed by: FAMILY MEDICINE

## 2023-10-27 PROCEDURE — 99395 PREV VISIT EST AGE 18-39: CPT | Performed by: FAMILY MEDICINE

## 2023-10-27 PROCEDURE — 80050 GENERAL HEALTH PANEL: CPT | Performed by: FAMILY MEDICINE

## 2023-10-27 PROCEDURE — 36415 COLL VENOUS BLD VENIPUNCTURE: CPT | Performed by: FAMILY MEDICINE

## 2023-10-27 NOTE — PROGRESS NOTES
"Subjective   Brenda Timmons is a 23 y.o. female and is here for a comprehensive physical exam. The patient reports no problems.    Do you take any herbs or supplements that were not prescribed by a doctor? no  Are you taking calcium supplements? no  Are you taking aspirin daily? no    The following portions of the patient's history were reviewed and updated as appropriate: allergies, current medications, past family history, past medical history, past social history, past surgical history, and problem list.    Review of Systems  Do you have pain that bothers you in your daily life? no  A comprehensive review of systems was negative.    Objective   /71 (BP Location: Left arm, Patient Position: Sitting, Cuff Size: Adult)   Pulse 84   Temp 97.3 °F (36.3 °C) (Infrared)   Ht 167.6 cm (66\")   Wt 75.1 kg (165 lb 9.6 oz)   SpO2 98%   BMI 26.73 kg/m²     General Appearance:    Alert, cooperative, no distress, appears stated age   Head:    Normocephalic, without obvious abnormality, atraumatic   Eyes:    PERRL, conjunctiva/corneas clear, EOM's intact, fundi     benign, both eyes   Ears:    Normal TM's and external ear canals, both ears   Nose:   Nares normal, septum midline, mucosa normal, no drainage    or sinus tenderness   Throat:   Lips, mucosa, and tongue normal; teeth and gums normal   Neck:   Supple, symmetrical, trachea midline, no adenopathy;     thyroid:  no enlargement/tenderness/nodules; no carotid    bruit or JVD   Back:     Symmetric, no curvature, ROM normal, no CVA tenderness   Lungs:     Clear to auscultation bilaterally, respirations unlabored   Chest Wall:    No tenderness or deformity    Heart:    Regular rate and rhythm, S1 and S2 normal, no murmur, rub   or gallop   Breast Exam:    No tenderness, masses, or nipple abnormality   Abdomen:     Soft, non-tender, bowel sounds active all four quadrants,     no masses, no organomegaly   Genitalia:    Normal female without lesion, discharge or " tenderness       Extremities:   Extremities normal, atraumatic, no cyanosis or edema   Pulses:   2+ and symmetric all extremities   Skin:   Skin color, texture, turgor normal, no rashes or lesions   Lymph nodes:   Cervical, supraclavicular, and axillary nodes normal   Neurologic:   CNII-XII intact, normal strength, sensation and reflexes     throughout        Office Visit on 10/27/2023   Component Date Value Ref Range Status    TSH 10/27/2023 0.938  0.270 - 4.200 uIU/mL Final    Glucose 10/27/2023 95  65 - 99 mg/dL Final    BUN 10/27/2023 16  6 - 20 mg/dL Final    Creatinine 10/27/2023 0.88  0.57 - 1.00 mg/dL Final    Sodium 10/27/2023 140  136 - 145 mmol/L Final    Potassium 10/27/2023 3.8  3.5 - 5.2 mmol/L Final    Chloride 10/27/2023 103  98 - 107 mmol/L Final    CO2 10/27/2023 27.0  22.0 - 29.0 mmol/L Final    Calcium 10/27/2023 10.0  8.6 - 10.5 mg/dL Final    Total Protein 10/27/2023 7.3  6.0 - 8.5 g/dL Final    Albumin 10/27/2023 4.8  3.5 - 5.2 g/dL Final    ALT (SGPT) 10/27/2023 13  1 - 33 U/L Final    AST (SGOT) 10/27/2023 14  1 - 32 U/L Final    Alkaline Phosphatase 10/27/2023 45  39 - 117 U/L Final    Total Bilirubin 10/27/2023 0.2  0.0 - 1.2 mg/dL Final    Globulin 10/27/2023 2.5  gm/dL Final    A/G Ratio 10/27/2023 1.9  g/dL Final    BUN/Creatinine Ratio 10/27/2023 18.2  7.0 - 25.0 Final    Anion Gap 10/27/2023 10.0  5.0 - 15.0 mmol/L Final    eGFR 10/27/2023 93.7  >60.0 mL/min/1.73 Final    Total Cholesterol 10/27/2023 143  0 - 200 mg/dL Final    Triglycerides 10/27/2023 65  0 - 150 mg/dL Final    HDL Cholesterol 10/27/2023 64 (H)  40 - 60 mg/dL Final    LDL Cholesterol  10/27/2023 66  0 - 100 mg/dL Final    VLDL Cholesterol 10/27/2023 13  5 - 40 mg/dL Final    LDL/HDL Ratio 10/27/2023 1.03   Final    WBC 10/27/2023 7.20  3.40 - 10.80 10*3/mm3 Final    RBC 10/27/2023 4.50  3.77 - 5.28 10*6/mm3 Final    Hemoglobin 10/27/2023 13.5  12.0 - 15.9 g/dL Final    Hematocrit 10/27/2023 41.4  34.0 - 46.6 % Final     MCV 10/27/2023 92.0  79.0 - 97.0 fL Final    MCH 10/27/2023 30.0  26.6 - 33.0 pg Final    MCHC 10/27/2023 32.6  31.5 - 35.7 g/dL Final    RDW 10/27/2023 11.8 (L)  12.3 - 15.4 % Final    RDW-SD 10/27/2023 39.8  37.0 - 54.0 fl Final    MPV 10/27/2023 9.2  6.0 - 12.0 fL Final    Platelets 10/27/2023 258  140 - 450 10*3/mm3 Final    Neutrophil % 10/27/2023 52.2  42.7 - 76.0 % Final    Lymphocyte % 10/27/2023 33.9  19.6 - 45.3 % Final    Monocyte % 10/27/2023 8.5  5.0 - 12.0 % Final    Eosinophil % 10/27/2023 4.3  0.3 - 6.2 % Final    Basophil % 10/27/2023 0.8  0.0 - 1.5 % Final    Immature Grans % 10/27/2023 0.3  0.0 - 0.5 % Final    Neutrophils, Absolute 10/27/2023 3.76  1.70 - 7.00 10*3/mm3 Final    Lymphocytes, Absolute 10/27/2023 2.44  0.70 - 3.10 10*3/mm3 Final    Monocytes, Absolute 10/27/2023 0.61  0.10 - 0.90 10*3/mm3 Final    Eosinophils, Absolute 10/27/2023 0.31  0.00 - 0.40 10*3/mm3 Final    Basophils, Absolute 10/27/2023 0.06  0.00 - 0.20 10*3/mm3 Final    Immature Grans, Absolute 10/27/2023 0.02  0.00 - 0.05 10*3/mm3 Final    nRBC 10/27/2023 0.1  0.0 - 0.2 /100 WBC Final    Diagnosis 10/27/2023 Comment   Final    NEGATIVE FOR INTRAEPITHELIAL LESION OR MALIGNANCY.    Specimen adequacy: 10/27/2023 Comment   Final    Satisfactory for evaluation. No endocervical component is identified.    Clinician Provided ICD-10: 10/27/2023 Comment   Final    Z00.00    Performed by: 10/27/2023 Comment   Final    Blanca Harris Cytotechnologist (ASCP)    . 10/27/2023 .   Final    Note: 10/27/2023 Comment   Final    The Pap smear is a screening test designed to aid in the detection of  premalignant and malignant conditions of the uterine cervix.  It is not a  diagnostic procedure and should not be used as the sole means of detecting  cervical cancer.  Both false-positive and false-negative reports do occur.    Method: 10/27/2023 Comment   Final    This liquid based ThinPrep(R) pap test was screened with the  use of an  image guided system.    HPV Aptima 10/27/2023 Negative  Negative Final    This nucleic acid amplification test detects fourteen high-risk  HPV types (16,18,31,33,35,39,45,51,52,56,58,59,66,68) without  differentiation.    HPV Genotype Reflex 10/27/2023 Comment   Final    Criteria not met, HPV Genotype not performed.       Assessment & Plan   Healthy female exam.   Diagnoses and all orders for this visit:    1. Encounter for preventive care (Primary)  -     TSH  -     CBC & Differential  -     Comprehensive Metabolic Panel  -     Lipid Panel  -     IGP, Aptima HPV, Rfx 16 / 18,45; Future  -     IGP, Aptima HPV, Rfx 16 / 18,45  -     PDF Report      1. Well exam  2. Patient Counseling:  --Nutrition: Stressed importance of moderation in sodium/caffeine intake, saturated fat and cholesterol, caloric balance, sufficient intake of fresh fruits, vegetables, fiber, calcium, iron, and 1 mg of folate supplement per day (for females capable of pregnancy).  --Exercise: Stressed the importance of regular exercise.   --Dental health: Discussed importance of regular tooth brushing, flossing, and dental visits.  --Immunizations reviewed.    3. Discussed the patient's BMI with her.  The BMI is above average; BMI management plan is completed  4. Follow up in one year

## 2023-10-28 LAB
ALBUMIN SERPL-MCNC: 4.8 G/DL (ref 3.5–5.2)
ALBUMIN/GLOB SERPL: 1.9 G/DL
ALP SERPL-CCNC: 45 U/L (ref 39–117)
ALT SERPL W P-5'-P-CCNC: 13 U/L (ref 1–33)
ANION GAP SERPL CALCULATED.3IONS-SCNC: 10 MMOL/L (ref 5–15)
AST SERPL-CCNC: 14 U/L (ref 1–32)
BASOPHILS # BLD AUTO: 0.06 10*3/MM3 (ref 0–0.2)
BASOPHILS NFR BLD AUTO: 0.8 % (ref 0–1.5)
BILIRUB SERPL-MCNC: 0.2 MG/DL (ref 0–1.2)
BUN SERPL-MCNC: 16 MG/DL (ref 6–20)
BUN/CREAT SERPL: 18.2 (ref 7–25)
CALCIUM SPEC-SCNC: 10 MG/DL (ref 8.6–10.5)
CHLORIDE SERPL-SCNC: 103 MMOL/L (ref 98–107)
CHOLEST SERPL-MCNC: 143 MG/DL (ref 0–200)
CO2 SERPL-SCNC: 27 MMOL/L (ref 22–29)
CREAT SERPL-MCNC: 0.88 MG/DL (ref 0.57–1)
DEPRECATED RDW RBC AUTO: 39.8 FL (ref 37–54)
EGFRCR SERPLBLD CKD-EPI 2021: 93.7 ML/MIN/1.73
EOSINOPHIL # BLD AUTO: 0.31 10*3/MM3 (ref 0–0.4)
EOSINOPHIL NFR BLD AUTO: 4.3 % (ref 0.3–6.2)
ERYTHROCYTE [DISTWIDTH] IN BLOOD BY AUTOMATED COUNT: 11.8 % (ref 12.3–15.4)
GLOBULIN UR ELPH-MCNC: 2.5 GM/DL
GLUCOSE SERPL-MCNC: 95 MG/DL (ref 65–99)
HCT VFR BLD AUTO: 41.4 % (ref 34–46.6)
HDLC SERPL-MCNC: 64 MG/DL (ref 40–60)
HGB BLD-MCNC: 13.5 G/DL (ref 12–15.9)
IMM GRANULOCYTES # BLD AUTO: 0.02 10*3/MM3 (ref 0–0.05)
IMM GRANULOCYTES NFR BLD AUTO: 0.3 % (ref 0–0.5)
LDLC SERPL CALC-MCNC: 66 MG/DL (ref 0–100)
LDLC/HDLC SERPL: 1.03 {RATIO}
LYMPHOCYTES # BLD AUTO: 2.44 10*3/MM3 (ref 0.7–3.1)
LYMPHOCYTES NFR BLD AUTO: 33.9 % (ref 19.6–45.3)
MCH RBC QN AUTO: 30 PG (ref 26.6–33)
MCHC RBC AUTO-ENTMCNC: 32.6 G/DL (ref 31.5–35.7)
MCV RBC AUTO: 92 FL (ref 79–97)
MONOCYTES # BLD AUTO: 0.61 10*3/MM3 (ref 0.1–0.9)
MONOCYTES NFR BLD AUTO: 8.5 % (ref 5–12)
NEUTROPHILS NFR BLD AUTO: 3.76 10*3/MM3 (ref 1.7–7)
NEUTROPHILS NFR BLD AUTO: 52.2 % (ref 42.7–76)
NRBC BLD AUTO-RTO: 0.1 /100 WBC (ref 0–0.2)
PLATELET # BLD AUTO: 258 10*3/MM3 (ref 140–450)
PMV BLD AUTO: 9.2 FL (ref 6–12)
POTASSIUM SERPL-SCNC: 3.8 MMOL/L (ref 3.5–5.2)
PROT SERPL-MCNC: 7.3 G/DL (ref 6–8.5)
RBC # BLD AUTO: 4.5 10*6/MM3 (ref 3.77–5.28)
SODIUM SERPL-SCNC: 140 MMOL/L (ref 136–145)
TRIGL SERPL-MCNC: 65 MG/DL (ref 0–150)
TSH SERPL DL<=0.05 MIU/L-ACNC: 0.94 UIU/ML (ref 0.27–4.2)
VLDLC SERPL-MCNC: 13 MG/DL (ref 5–40)
WBC NRBC COR # BLD: 7.2 10*3/MM3 (ref 3.4–10.8)

## 2023-10-31 LAB
CYTOLOGIST CVX/VAG CYTO: NORMAL
CYTOLOGY CVX/VAG DOC CYTO: NORMAL
CYTOLOGY CVX/VAG DOC THIN PREP: NORMAL
DX ICD CODE: NORMAL
HIV 1 & 2 AB SER-IMP: NORMAL
HPV GENOTYPE REFLEX: NORMAL
HPV I/H RISK 4 DNA CVX QL PROBE+SIG AMP: NEGATIVE
OTHER STN SPEC: NORMAL
STAT OF ADQ CVX/VAG CYTO-IMP: NORMAL

## 2024-06-17 RX ORDER — SPIRONOLACTONE 100 MG/1
100 TABLET, FILM COATED ORAL DAILY
Qty: 90 TABLET | Refills: 3 | Status: SHIPPED | OUTPATIENT
Start: 2024-06-17 | End: 2024-06-19 | Stop reason: SDUPTHER

## 2024-06-19 RX ORDER — SPIRONOLACTONE 100 MG/1
100 TABLET, FILM COATED ORAL DAILY
Qty: 90 TABLET | Refills: 3 | Status: SHIPPED | OUTPATIENT
Start: 2024-06-19

## 2024-10-30 RX ORDER — TRIAMCINOLONE ACETONIDE 1 MG/G
1 CREAM TOPICAL 2 TIMES DAILY
Qty: 80 G | Refills: 1 | Status: SHIPPED | OUTPATIENT
Start: 2024-10-30

## 2024-10-30 RX ORDER — VALACYCLOVIR HYDROCHLORIDE 1 G/1
1000 TABLET, FILM COATED ORAL 2 TIMES DAILY
Qty: 30 TABLET | Refills: 1 | Status: SHIPPED | OUTPATIENT
Start: 2024-10-30

## 2025-06-06 RX ORDER — FLUOXETINE 10 MG/1
10 CAPSULE ORAL DAILY
Qty: 90 CAPSULE | Refills: 3 | Status: SHIPPED | OUTPATIENT
Start: 2025-06-06

## 2025-08-25 RX ORDER — TRETINOIN 0.25 MG/G
1 CREAM TOPICAL NIGHTLY
Qty: 45 G | Refills: 2 | Status: SHIPPED | OUTPATIENT
Start: 2025-08-25

## 2025-08-25 RX ORDER — TRETINOIN 0.25 MG/G
CREAM TOPICAL
Qty: 40 G | Refills: 2 | OUTPATIENT
Start: 2025-08-25